# Patient Record
Sex: FEMALE | Race: WHITE | NOT HISPANIC OR LATINO
[De-identification: names, ages, dates, MRNs, and addresses within clinical notes are randomized per-mention and may not be internally consistent; named-entity substitution may affect disease eponyms.]

---

## 2017-06-07 ENCOUNTER — CHARTING TRANS (OUTPATIENT)
Dept: OTHER | Age: 27
End: 2017-06-07

## 2018-11-03 VITALS
SYSTOLIC BLOOD PRESSURE: 110 MMHG | HEART RATE: 70 BPM | HEIGHT: 66 IN | RESPIRATION RATE: 16 BRPM | DIASTOLIC BLOOD PRESSURE: 64 MMHG | TEMPERATURE: 98 F | WEIGHT: 120 LBS | BODY MASS INDEX: 19.29 KG/M2

## 2021-05-02 ENCOUNTER — IMMUNIZATION (OUTPATIENT)
Dept: LAB | Age: 31
End: 2021-05-02
Attending: HOSPITALIST
Payer: OTHER GOVERNMENT

## 2021-05-02 DIAGNOSIS — Z23 NEED FOR VACCINATION: Primary | ICD-10-CM

## 2021-05-02 PROCEDURE — 0001A SARSCOV2 VAC 30MCG/0.3ML IM: CPT

## 2021-05-23 ENCOUNTER — IMMUNIZATION (OUTPATIENT)
Dept: LAB | Age: 31
End: 2021-05-23
Attending: HOSPITALIST
Payer: OTHER GOVERNMENT

## 2021-05-23 DIAGNOSIS — Z23 NEED FOR VACCINATION: Primary | ICD-10-CM

## 2021-05-23 PROCEDURE — 0002A SARSCOV2 VAC 30MCG/0.3ML IM: CPT

## 2022-04-21 ENCOUNTER — LAB REQUISITION (OUTPATIENT)
Dept: LAB | Age: 32
End: 2022-04-21

## 2022-04-21 DIAGNOSIS — Z12.4 ENCOUNTER FOR SCREENING FOR MALIGNANT NEOPLASM OF CERVIX: ICD-10-CM

## 2022-04-21 DIAGNOSIS — Z11.3 ENCOUNTER FOR SCREENING FOR INFECTIONS WITH A PREDOMINANTLY SEXUAL MODE OF TRANSMISSION: ICD-10-CM

## 2022-04-21 PROCEDURE — 87591 N.GONORRHOEAE DNA AMP PROB: CPT | Performed by: CLINICAL MEDICAL LABORATORY

## 2022-04-21 PROCEDURE — 87491 CHLMYD TRACH DNA AMP PROBE: CPT | Performed by: CLINICAL MEDICAL LABORATORY

## 2022-04-21 PROCEDURE — 88175 CYTOPATH C/V AUTO FLUID REDO: CPT | Performed by: CLINICAL MEDICAL LABORATORY

## 2022-04-21 PROCEDURE — 87624 HPV HI-RISK TYP POOLED RSLT: CPT | Performed by: CLINICAL MEDICAL LABORATORY

## 2022-04-22 DIAGNOSIS — N94.10 DYSPAREUNIA, FEMALE: Primary | ICD-10-CM

## 2022-04-22 LAB
C TRACH RRNA CVX QL NAA+PROBE: NEGATIVE
Lab: NORMAL
N GONORRHOEA RRNA CVX QL NAA+PROBE: NEGATIVE

## 2022-04-26 LAB
CASE RPRT: NORMAL
CLINICAL INFO: NORMAL
CYTOLOGY CVX/VAG STUDY: NORMAL
HPV16+18+45 E6+E7MRNA CVX NAA+PROBE: NEGATIVE
Lab: NORMAL
PAP EDUCATIONAL NOTE: NORMAL
SPECIMEN ADEQUACY: NORMAL

## 2022-05-13 ENCOUNTER — APPOINTMENT (OUTPATIENT)
Dept: PHYSICAL MEDICINE AND REHAB | Age: 32
End: 2022-05-13
Attending: OBSTETRICS & GYNECOLOGY

## 2022-05-24 ENCOUNTER — HOSPITAL ENCOUNTER (OUTPATIENT)
Dept: PHYSICAL MEDICINE AND REHAB | Age: 32
Discharge: STILL ADMITTED OR ADMINISTRATIVE | End: 2022-05-24
Attending: OBSTETRICS & GYNECOLOGY

## 2022-05-24 PROCEDURE — 97535 SELF CARE MNGMENT TRAINING: CPT | Performed by: PHYSICAL THERAPIST

## 2022-05-24 PROCEDURE — 97162 PT EVAL MOD COMPLEX 30 MIN: CPT | Performed by: PHYSICAL THERAPIST

## 2022-06-09 ENCOUNTER — HOSPITAL ENCOUNTER (OUTPATIENT)
Dept: PHYSICAL MEDICINE AND REHAB | Age: 32
Discharge: STILL ADMITTED OR ADMINISTRATIVE | End: 2022-06-09

## 2022-06-09 PROCEDURE — 97140 MANUAL THERAPY 1/> REGIONS: CPT | Performed by: PHYSICAL THERAPIST

## 2022-06-09 ASSESSMENT — ENCOUNTER SYMPTOMS: PAIN: 1

## 2022-06-27 ENCOUNTER — HOSPITAL ENCOUNTER (OUTPATIENT)
Dept: PHYSICAL MEDICINE AND REHAB | Age: 32
Discharge: STILL ADMITTED OR ADMINISTRATIVE | End: 2022-06-27

## 2022-06-27 PROCEDURE — 97140 MANUAL THERAPY 1/> REGIONS: CPT | Performed by: PHYSICAL THERAPIST

## 2022-06-27 ASSESSMENT — ENCOUNTER SYMPTOMS: PAIN: 1

## 2022-07-06 ENCOUNTER — APPOINTMENT (OUTPATIENT)
Dept: PHYSICAL MEDICINE AND REHAB | Age: 32
End: 2022-07-06
Attending: OBSTETRICS & GYNECOLOGY

## 2022-07-13 ENCOUNTER — HOSPITAL ENCOUNTER (OUTPATIENT)
Dept: PHYSICAL MEDICINE AND REHAB | Age: 32
Discharge: STILL ADMITTED OR ADMINISTRATIVE | End: 2022-07-13
Attending: OBSTETRICS & GYNECOLOGY

## 2022-07-13 PROCEDURE — 97140 MANUAL THERAPY 1/> REGIONS: CPT | Performed by: PHYSICAL THERAPIST

## 2022-07-25 ENCOUNTER — HOSPITAL ENCOUNTER (OUTPATIENT)
Dept: PHYSICAL MEDICINE AND REHAB | Age: 32
Discharge: STILL ADMITTED OR ADMINISTRATIVE | End: 2022-07-25
Attending: OBSTETRICS & GYNECOLOGY

## 2022-07-25 PROCEDURE — 97140 MANUAL THERAPY 1/> REGIONS: CPT | Performed by: PHYSICAL THERAPIST

## 2022-07-25 ASSESSMENT — ENCOUNTER SYMPTOMS
PAIN SEVERITY NOW: 0
PAIN SCALE AT HIGHEST: 7
PAIN SCALE AT LOWEST: 0
PAIN FREQUENCY: INTERMITTENT

## 2022-08-01 ENCOUNTER — HOSPITAL ENCOUNTER (OUTPATIENT)
Dept: PHYSICAL MEDICINE AND REHAB | Age: 32
Discharge: STILL ADMITTED OR ADMINISTRATIVE | End: 2022-08-01
Attending: OBSTETRICS & GYNECOLOGY

## 2022-08-01 PROCEDURE — 97140 MANUAL THERAPY 1/> REGIONS: CPT | Performed by: PHYSICAL THERAPIST

## 2022-08-01 ASSESSMENT — ENCOUNTER SYMPTOMS: PAIN: 1

## 2025-02-07 NOTE — TELEPHONE ENCOUNTER
Patient verified name and     6w0d by LMP   Regular cycles.  Appointment scheduled 3/4. Aware of scheduling details  Informed of providers in the office and delivering location.

## 2025-03-12 NOTE — ED PROVIDER NOTES
Patient Seen in: [facility] Emergency Department      History     Chief Complaint   Patient presents with    Abdomen/Flank Pain    Constipation     Stated Complaint: Abdominal cramping after taking oral laxative. Pt is 10 wks pregnant.    Subjective:   HPI      34-year-old female 10 weeks pregnant presents with abdominal pain.  States she has been constipated the last 2 weeks and started taking oral laxatives as well as suppository.  She had a large bowel movement yesterday and felt like she was able to fully evacuate but since last night has been having severe diffuse abdominal cramping, worse in lower abdomen.  Does have urinary frequency.  No flank pain or fevers.    Objective:     History reviewed. No pertinent past medical history.           History reviewed. No pertinent surgical history.             Social History     Socioeconomic History    Marital status:    Tobacco Use    Smoking status: Never    Smokeless tobacco: Never   Vaping Use    Vaping status: Never Used   Substance and Sexual Activity    Alcohol use: Not Currently    Drug use: Not Currently                  Physical Exam     ED Triage Vitals [03/12/25 0519]   /76   Pulse 117   Resp 20   Temp 98.2 °F (36.8 °C)   Temp src Temporal   SpO2 100 %   O2 Device None (Room air)       Current Vitals:   Vital Signs  BP: 122/73  Pulse: 95  Resp: 19  Temp: 98.2 °F (36.8 °C)  Temp src: Temporal  MAP (mmHg): 87    Oxygen Therapy  SpO2: 100 %  O2 Device: None (Room air)        Physical Exam  Constitutional:       General: Is not in acute distress.     Appearance: Is not ill-appearing.   HENT:      Head: Normocephalic and atraumatic.      Mouth/Throat:      Mouth: Mucous membranes are moist.   Eyes:      Conjunctiva/sclera: Conjunctivae normal.      Pupils: Pupils are equal, round, and reactive to light.   Cardiovascular:      Rate and Rhythm: Normal rate and regular rhythm.   Pulmonary:      Effort: Pulmonary effort is normal. No respiratory  distress.      Breath sounds: Normal breath sounds.   Abdominal:      General: Abdomen is gravid. There is no distension.      Tenderness: Diffuse abdominal tenderness  Musculoskeletal:      Right lower leg: No edema.      Left lower leg: No edema.   Skin:     General: Skin is warm and dry.   Neurological:      General: No focal deficit present.      Mental Status: Is alert.       ED Course     Labs Reviewed   CBC WITH DIFFERENTIAL WITH PLATELET - Abnormal; Notable for the following components:       Result Value    WBC 12.2 (*)     Neutrophil Absolute Prelim 11.01 (*)     Neutrophil Absolute 11.01 (*)     Lymphocyte Absolute 0.62 (*)     All other components within normal limits   COMP METABOLIC PANEL (14) - Abnormal; Notable for the following components:    Sodium 134 (*)     BUN <5 (*)     Albumin 5.1 (*)     All other components within normal limits   URINALYSIS WITH CULTURE REFLEX - Abnormal; Notable for the following components:    Urine Color Colorless (*)     Ketones Urine Trace (*)     All other components within normal limits   HCG, BETA SUBUNIT (QUANT PREGNANCY TEST) - Abnormal; Notable for the following components:    Hcg Quantitative 96,862.6 (*)     All other components within normal limits   POCT PREGNANCY URINE - Abnormal; Notable for the following components:    POCT Urine Pregnancy Positive (*)     All other components within normal limits   LIPASE - Normal                   MDM      Considered all emergent etiologies, differential includes but is not limited to: Constipation, appendicitis, UTI, urinary retention, kidney stone, ectopic pregnancy     I have independently visualized the radiology images, my focus/limited interpretation: Ultrasound demonstrating IUP     Defer to radiologist for other/incidental findings     Labs and UA unremarkable.  Imaging stable.  Patient with very poor urine output and bladder scan showing greater than 600.  Mcbride catheter placed with greater than 1 L out  immediately.  Complete resolution of all symptoms.  Suspect secondary to severe constipation which she has been dealing with.  Home with leg bag and urology follow-up.  Instructed to continue bowel regimen with MiraLAX daily.        Medical Decision Making      Disposition and Plan     Clinical Impression:  1. Urinary retention         Disposition:  Discharge  3/12/2025  8:35 am    Follow-up:  [facility]   [address] [facility] [address]  [phone]  Follow up in 1 week(s)            Medications Prescribed:  There are no discharge medications for this patient.          Supplementary Documentation:

## 2025-03-12 NOTE — ED INITIAL ASSESSMENT (HPI)
Pt presents to ED for abd cramping x 8pm yesterday after taking oral laxative at 9am yesterday to relieve constipation. Also took some miralax. Pt reports being 10 weeks pregnant. No vaginal bleeding.

## (undated) NOTE — LETTER
March 12, 2025    Patient: Ashlyn Nicole   Date of Visit: 3/12/2025       To Whom It May Concern:    Ashlyn Nicole was seen and treated in our emergency department on 3/12/2025. She should not return to work until 3/17/2025 .    If you have any questions or concerns, please don't hesitate to call.       Encounter Provider(s):    Philip Malagon MD